# Patient Record
Sex: FEMALE | Race: BLACK OR AFRICAN AMERICAN | Employment: FULL TIME | URBAN - METROPOLITAN AREA
[De-identification: names, ages, dates, MRNs, and addresses within clinical notes are randomized per-mention and may not be internally consistent; named-entity substitution may affect disease eponyms.]

---

## 2018-03-06 ENCOUNTER — APPOINTMENT (OUTPATIENT)
Dept: ULTRASOUND IMAGING | Facility: CLINIC | Age: 23
End: 2018-03-06
Attending: EMERGENCY MEDICINE

## 2018-03-06 ENCOUNTER — HOSPITAL ENCOUNTER (EMERGENCY)
Facility: CLINIC | Age: 23
Discharge: HOME | End: 2018-03-06
Attending: EMERGENCY MEDICINE | Admitting: EMERGENCY MEDICINE

## 2018-03-06 VITALS
HEART RATE: 77 BPM | OXYGEN SATURATION: 100 % | WEIGHT: 150 LBS | BODY MASS INDEX: 21.47 KG/M2 | RESPIRATION RATE: 14 BRPM | DIASTOLIC BLOOD PRESSURE: 64 MMHG | HEIGHT: 70 IN | SYSTOLIC BLOOD PRESSURE: 119 MMHG | TEMPERATURE: 98.7 F

## 2018-03-06 DIAGNOSIS — N94.6 DYSMENORRHEA: ICD-10-CM

## 2018-03-06 DIAGNOSIS — N93.9 VAGINAL BLEEDING: ICD-10-CM

## 2018-03-06 LAB
B-HCG SERPL-ACNC: <1 IU/L (ref 0–5)
BASOPHILS # BLD AUTO: 0 10E9/L (ref 0–0.2)
BASOPHILS NFR BLD AUTO: 0.3 %
DIFFERENTIAL METHOD BLD: NORMAL
EOSINOPHIL # BLD AUTO: 0 10E9/L (ref 0–0.7)
EOSINOPHIL NFR BLD AUTO: 0.3 %
ERYTHROCYTE [DISTWIDTH] IN BLOOD BY AUTOMATED COUNT: 14.1 % (ref 10–15)
HCT VFR BLD AUTO: 37.4 % (ref 35–47)
HGB BLD-MCNC: 12.1 G/DL (ref 11.7–15.7)
IMM GRANULOCYTES # BLD: 0 10E9/L (ref 0–0.4)
IMM GRANULOCYTES NFR BLD: 0.2 %
LYMPHOCYTES # BLD AUTO: 2.6 10E9/L (ref 0.8–5.3)
LYMPHOCYTES NFR BLD AUTO: 25.8 %
MCH RBC QN AUTO: 29.6 PG (ref 26.5–33)
MCHC RBC AUTO-ENTMCNC: 32.4 G/DL (ref 31.5–36.5)
MCV RBC AUTO: 91 FL (ref 78–100)
MONOCYTES # BLD AUTO: 1 10E9/L (ref 0–1.3)
MONOCYTES NFR BLD AUTO: 10.2 %
NEUTROPHILS # BLD AUTO: 6.4 10E9/L (ref 1.6–8.3)
NEUTROPHILS NFR BLD AUTO: 63.2 %
NRBC # BLD AUTO: 0 10*3/UL
NRBC BLD AUTO-RTO: 0 /100
PLATELET # BLD AUTO: 285 10E9/L (ref 150–450)
RBC # BLD AUTO: 4.09 10E12/L (ref 3.8–5.2)
WBC # BLD AUTO: 10.1 10E9/L (ref 4–11)

## 2018-03-06 PROCEDURE — 99284 EMERGENCY DEPT VISIT MOD MDM: CPT | Mod: 25

## 2018-03-06 PROCEDURE — 86900 BLOOD TYPING SEROLOGIC ABO: CPT | Performed by: EMERGENCY MEDICINE

## 2018-03-06 PROCEDURE — 76801 OB US < 14 WKS SINGLE FETUS: CPT

## 2018-03-06 PROCEDURE — 86901 BLOOD TYPING SEROLOGIC RH(D): CPT | Performed by: EMERGENCY MEDICINE

## 2018-03-06 PROCEDURE — 86850 RBC ANTIBODY SCREEN: CPT | Performed by: EMERGENCY MEDICINE

## 2018-03-06 PROCEDURE — 84702 CHORIONIC GONADOTROPIN TEST: CPT | Performed by: EMERGENCY MEDICINE

## 2018-03-06 PROCEDURE — 85025 COMPLETE CBC W/AUTO DIFF WBC: CPT | Performed by: EMERGENCY MEDICINE

## 2018-03-06 ASSESSMENT — ENCOUNTER SYMPTOMS: ABDOMINAL PAIN: 1

## 2018-03-06 NOTE — ED AVS SNAPSHOT
Emergency Department    [address] [place] [address]    Phone:  [phone]    Fax:  [phone]                                       Kris Ferro   MRN: [id number]    Department:   Emergency Department   Date of Visit:  3/6/2018           After Visit Summary Signature Page     I have received my discharge instructions, and my questions have been answered. I have discussed any challenges I see with this plan with the nurse or doctor.    ..........................................................................................................................................  Patient/Patient Representative Signature      ..........................................................................................................................................  Patient Representative Print Name and Relationship to Patient    ..................................................               ................................................  Date                                            Time    ..........................................................................................................................................  Reviewed by Signature/Title    ...................................................              ..............................................  Date                                                            Time

## 2018-03-06 NOTE — ED AVS SNAPSHOT
Emergency Department    [address] [facility] [address]    Phone:  [phone]    Fax:  [phone]                                       Kris Ferro   MRN: [id number]    Department:   Emergency Department   Date of Visit:  3/6/2018           Patient Information     Date Of Birth          1995        Your diagnoses for this visit were:     Vaginal bleeding     Dysmenorrhea        You were seen by Aditi Diggs MD.      Follow-up Information     Schedule an appointment as soon as possible for a visit with Clinic, [facility].    Contact information:    [facility]  [address]  [facility] [address] [phone]          Follow up with  Emergency Department.    Specialty:  EMERGENCY MEDICINE    Why:  If symptoms worsen    Contact information:    [address] [facility] [id number] [phone]        Discharge Instructions         Painful Menstrual Periods (Dysmenorrhea)    Dysmenorrhea is the term used to describe painful menstrual periods.  The uterus is a muscle. Normally, chemicals called prostaglandins cause the uterus to contract during your period. The contractions push out the build-up of tissue that occurs each month inside the uterus. If the contraction is very strong, it can cause pain. The pain may feel like cramping in the lower abdomen, lower back, or thighs. In severe cases, you may have other symptoms as well. These can include nausea, vomiting, loose stools, sweating, or dizziness.  There are 2 types of dysmenorrhea:  Primary dysmenorrhea refers to common menstrual cramps. It may begin 1 or 2 years after you first get your period. It may get better or go away as you get older or when you have a baby. The cramps are most often felt just before, or on the day of your period. They may last 1 to 3 days. Treatment is with medicines and comfort measures as described below (see the  Home care   section).  Secondary dysmenorrhea may start later in life. It describes menstrual pain that occurs due to underlying health problem. The pain may last longer than common menstrual cramps. It may also worsen over time. Some problems that can lead to secondary dysmenorrhea include:     Pelvic inflammatory disease (PID): Infection that involves the female reproductive organs, such as the uterus and fallopian tubes    Fibroids: Benign growths within the wall of the uterus (not cancer)    Endometriosis: Tissue that normally only lines the uterus also grows outside of it (because the abnormal tissue also swells and bleeds each month, it can cause pain)  Once the cause of secondary dysmenorrhea is found, it can be treated. Your healthcare provider will discuss options with you as needed. Your care may also include some of the treatments described below (see the  Home care  section).  Home care  Medicines  Certain medicines can be used to help relieve or prevent menstrual pain and cramping. These can include:    Nonsteroidal anti-inflammatory drugs (NSAIDs), such as ibuprofen    Prescription pain medicine, if needed    Hormone therapy (this includes most methods of hormonal birth control such as pills, shots, or a hormone-releasing IUD)  General care  To help relieve pain and cramping, try these tips:    Rest as needed.    Apply a heating pad to the lower belly or back as directed. A warm bath or massage to these areas may also help.    Exercise regularly. Many women find that being more active each week helps reduce pain and cramping.    Ask your healthcare provider for advice about other treatments you can try to help control pain and cramping.  Follow-up care  Follow up with your healthcare provider as advised.  When to seek medical advice  Call your healthcare provider right away if any of these occur:    Fever of 100.4 F (38 C) or higher, or as directed by your provider    Pain or cramping worsens or doesn t improve  with medicine    Pain or cramping lasts longer than usual or occurs between periods    Unusual vaginal discharge between periods    Bleeding becomes heavy (soaking more than 1 pad or tampon every hour for 3 hours)    Passage of pink or gray tissue from the vagina  Date Last Reviewed: 6/11/2015 2000-2017 [employer] [address] [place] [address]. All rights reserved. This information is not intended as a substitute for professional medical care. Always follow your healthcare professional's instructions.          24 Hour Appointment Hotline       To make an appointment at any [facility], call [phone] ([phone]). If you don't have a family doctor or clinic, we will help you find one. [place] clinics are conveniently located to serve the needs of you and your family.             Review of your medicines      Our records show that you are taking the medicines listed below. If these are incorrect, please call your family doctor or clinic.        Dose / Directions Last dose taken    TYLENOL PO        Refills:  0                Procedures and tests performed during your visit     CBC with platelets differential    HCG quantitative pregnancy    US OB < 14 Weeks Single      Orders Needing Specimen Collection     None      Pending Results     No orders found from 3/4/2018 to 3/7/2018.            Pending Culture Results     No orders found from 3/4/2018 to 3/7/2018.            Pending Results Instructions     If you had any lab results that were not finalized at the time of your Discharge, you can call the ED Lab Result RN at [phone]. You will be contacted by this team for any positive Lab results or changes in treatment. The nurses are available 7 days a week from 10A to 6:30P.  You can leave a message 24 hours per day and they will return your call.        Test Results From Your Hospital Stay        3/6/2018  9:44 PM      Component Results     Component Value Ref Range &  Units Status    WBC 10.1 4.0 - 11.0 10e9/L Final    RBC Count 4.09 3.8 - 5.2 10e12/L Final    Hemoglobin 12.1 11.7 - 15.7 g/dL Final    Hematocrit 37.4 35.0 - 47.0 % Final    MCV 91 78 - 100 fl Final    MCH 29.6 26.5 - 33.0 pg Final    MCHC 32.4 31.5 - 36.5 g/dL Final    RDW 14.1 10.0 - 15.0 % Final    Platelet Count 285 150 - 450 10e9/L Final    Diff Method Automated Method  Final    % Neutrophils 63.2 % Final    % Lymphocytes 25.8 % Final    % Monocytes 10.2 % Final    % Eosinophils 0.3 % Final    % Basophils 0.3 % Final    % Immature Granulocytes 0.2 % Final    Nucleated RBCs 0 0 /100 Final    Absolute Neutrophil 6.4 1.6 - 8.3 10e9/L Final    Absolute Lymphocytes 2.6 0.8 - 5.3 10e9/L Final    Absolute Monocytes 1.0 0.0 - 1.3 10e9/L Final    Absolute Eosinophils 0.0 0.0 - 0.7 10e9/L Final    Absolute Basophils 0.0 0.0 - 0.2 10e9/L Final    Abs Immature Granulocytes 0.0 0 - 0.4 10e9/L Final    Absolute Nucleated RBC 0.0  Final               3/6/2018 10:14 PM      Component Results     Component Value Ref Range & Units Status    HCG Quantitative Serum <1 0 - 5 IU/L Final               3/6/2018 10:40 PM      Narrative     US OB < 14 WEEKS SINGLE   3/6/2018 10:01 PM     HISTORY: Early pregnancy with right-sided cramping pain and bleeding.    COMPARISON: None.    FINDINGS: The patient declined endovaginal scanning. No visible  intrauterine gestational sac identified. The right ovary appears  within normal limits. The left ovary is limited in visualization, but  is grossly unremarkable. No adnexal lesion. No free fluid in the  pelvis. Endometrial stripe is 0.4 cm.        Impression     IMPRESSION: No intrauterine products of conception identified. No  adnexal lesion or free fluid within the pelvis. In the setting of  positive pregnancy, the differential includes early pregnancy,  nonviable pregnancy, and ectopic pregnancy. Recommend serial beta hCG  assessment and imaging as needed.    VERONICA TRAN MD                 Clinical Quality Measure: Blood Pressure Screening     Your blood pressure was checked while you were in the emergency department today. The last reading we obtained was  BP: 119/64 . Please read the guidelines below about what these numbers mean and what you should do about them.  If your systolic blood pressure (the top number) is less than 120 and your diastolic blood pressure (the bottom number) is less than 80, then your blood pressure is normal. There is nothing more that you need to do about it.  If your systolic blood pressure (the top number) is 120-139 or your diastolic blood pressure (the bottom number) is 80-89, your blood pressure may be higher than it should be. You should have your blood pressure rechecked within a year by a primary care provider.  If your systolic blood pressure (the top number) is 140 or greater or your diastolic blood pressure (the bottom number) is 90 or greater, you may have high blood pressure. High blood pressure is treatable, but if left untreated over time it can put you at risk for heart attack, stroke, or kidney failure. You should have your blood pressure rechecked by a primary care provider within the next 4 weeks.  If your provider in the emergency department today gave you specific instructions to follow-up with your doctor or provider even sooner than that, you should follow that instruction and not wait for up to 4 weeks for your follow-up visit.        Thank you for choosing [facility]       Thank you for choosing [facility] for your care. Our goal is always to provide you with excellent care. Hearing back from our patients is one way we can continue to improve our services. Please take a few minutes to complete the written survey that you may receive in the mail after you visit with us. Thank you!        [employer] Information     [employer] lets you send messages to your doctor, view your test results, renew your prescriptions, schedule appointments and more. To sign up,  "go to [email or web address] . Click on \"Log in\" on the left side of the screen, which will take you to the Welcome page. Then click on \"Sign up Now\" on the right side of the page.     You will be asked to enter the access code listed below, as well as some personal information. Please follow the directions to create your username and password.     Your access code is: [email or web address]  Expires: 2018 10:54 PM     Your access code will  in 90 days. If you need help or a new code, please call your [facility] clinic or [phone].        Care EveryWhere ID     This is your Care EveryWhere ID. This could be used by other organizations to access your [facility] medical records  [id number]        Equal Access to Services     RED LAW : Thom Mcdonough, shavon harris, yolanda dey, sheila weinberg. So [facility] [phone].    ATENCIÓN: Si habla español, tiene a navarro disposición servicios gratuitos de asistencia lingüística. Devin al [phone].    We comply with applicable federal civil rights laws and [place] laws. We do not discriminate on the basis of race, color, national origin, age, disability, sex, sexual orientation, or gender identity.            After Visit Summary       This is your record. Keep this with you and show to your community pharmacist(s) and doctor(s) at your next visit.                  "

## 2018-03-07 NOTE — ED PROVIDER NOTES
"  History     Chief Complaint:  Abdominal Pain (2 weeks pg. Pt states \"A lot of bleeding\")    The history is provided by the patient.      Kris Ferro is a 22 year old  female who presents to the emergency department today for evaluation of abdominal pain. The patient presents with abdominal pain on her left and right side. She reportedly took a home pregnancy test 3 days ago that showed positive. Her last period was early in January, and does not have an OB/GYN. Since yesterday she has had vaginal bleeding similar to her normal period. She denies any clotting disorder, recent trauma, and recent sex.  She denies dizziness or lightheadedness.  Patient endorses bloating.  Normal bowel movements.  No back pain.  No fever or vomiting.  No vaginal discharge that is new or different.     Allergies:  No Known Drug Allergies    Medications:    Acetaminophen    Past Medical History:    History reviewed. No pertinent past medical history.    Past Surgical History:    History reviewed. No pertinent surgical history.    Family History:    History reviewed. No pertinent family history.    Social History:  The patient was accompanied to the ED by herself.  Smoking Status: Never Smoker   Marital Status:  Single     Review of Systems   Gastrointestinal: Positive for abdominal pain.   Genitourinary: Positive for vaginal bleeding.   All other systems reviewed and are negative.    Physical Exam     Patient Vitals for the past 24 hrs:   BP Temp Temp src Pulse Resp SpO2 Height Weight   18 2057 119/64 98.7  F (37.1  C) Oral 77 14 100 % 1.778 m (5' 10\") 68 kg (150 lb)      Physical Exam  General: Patient is alert and normal appearing.  HEENT: Head atraumatic    Eyes: pupils equal and reactive. Conjunctiva clear   Nares: patent   Oropharynx: no lesions, uvula midline, no palatal draping, normal voice, no trismus  Neck: Supple without lymphadenopathy, no meningismus  Chest: Heart regular rate and rhythm.   Lungs: Equal clear to " auscultation with no wheeze or rales  Abdomen: Soft, generalized lower abdominal tenderness, no rebound or guarding, nondistended, normal bowel sounds  Back: No costovertebral angle tenderness, no midline C, T or L spine tenderness  Neuro: Grossly nonfocal, normal speech, strength equal bilaterally, CN 2-12 intact  Extremities: No deformities, equal radial and DP pulses. No clubbing, cyanosis.  No edema  Skin: Warm and dry with no rash.       Emergency Department Course         Imaging:  Radiology findings were communicated with the patient who voiced understanding of the findings.    US OB < 14 Weeks Single  No intrauterine products of conception identified. No  adnexal lesion or free fluid within the pelvis. In the setting of  positive pregnancy, the differential includes early pregnancy,  nonviable pregnancy, and ectopic pregnancy. Recommend serial beta hCG  assessment and imaging as needed.  Reading per radiology    Laboratory:  Laboratory findings were communicated with the patient who voiced understanding of the findings.    HCG Quantitative Pregnancy: <1  CBC: WBC 10.1, HGB 12.1,     Emergency Department Course:    2057 Nursing notes and vitals reviewed.    2115 I performed an exam of the patient as documented above.     2130 IV was inserted and blood was drawn for laboratory testing, results above.     2201 The patient was sent for a US while in the emergency department, results above.      2300 I personally reviewed the lab and imaging results with the patient and answered all related questions prior to discharge.    Impression & Plan      Medical Decision Making:  Kris Ferro is a 22 year old female who presents to the emergency department today for evaluation of vaginal breech bleeding and pregnancy.  Patient states she took a home pregnancy test 2 days ago that was positive.  She is unsure of her last menstrual period but feels that it was in January.  Here in the emergency department patient  had a quantitative hCG that was less than 1.  Prior to obtaining that result she did go to ultrasound, this ultrasound does not show any intrauterine products of conception, in addition there is no adnexal lesions or free fluid in the pelvis.  Do not feel the patient's symptoms consistent with ectopic pregnancy given an hCG of less than 1.  This likely represents the patient's menstrual period and the cramping that she has felt consistent with dysmenorrhea.  I do not feel the patient has tubo-ovarian abscess, ovarian torsion as the cause of her symptoms today.  Patient's hemoglobin levels at an acceptable level and she is hemodynamically stable.  Discussed with patient following up with OB/GYN and return precautions the emergency department.  Patient expressed agreement and understanding the plan and all questions and concerns addressed.    Diagnosis:    ICD-10-CM    1. Vaginal bleeding N93.9    2. Dysmenorrhea N94.6      Disposition:   Discharge    Scribe Disclosure:  Bladimir PACKER, am serving as a scribe at 9:15 PM on 3/6/2018 to document services personally performed by Aditi Diggs MD based on my observations and the provider's statements to me.      EMERGENCY DEPARTMENT       Aditi Diggs MD  03/06/18 0676

## 2018-03-07 NOTE — DISCHARGE INSTRUCTIONS
Painful Menstrual Periods (Dysmenorrhea)    Dysmenorrhea is the term used to describe painful menstrual periods.  The uterus is a muscle. Normally, chemicals called prostaglandins cause the uterus to contract during your period. The contractions push out the build-up of tissue that occurs each month inside the uterus. If the contraction is very strong, it can cause pain. The pain may feel like cramping in the lower abdomen, lower back, or thighs. In severe cases, you may have other symptoms as well. These can include nausea, vomiting, loose stools, sweating, or dizziness.  There are 2 types of dysmenorrhea:  Primary dysmenorrhea refers to common menstrual cramps. It may begin 1 or 2 years after you first get your period. It may get better or go away as you get older or when you have a baby. The cramps are most often felt just before, or on the day of your period. They may last 1 to 3 days. Treatment is with medicines and comfort measures as described below (see the  Home care  section).  Secondary dysmenorrhea may start later in life. It describes menstrual pain that occurs due to underlying health problem. The pain may last longer than common menstrual cramps. It may also worsen over time. Some problems that can lead to secondary dysmenorrhea include:     Pelvic inflammatory disease (PID): Infection that involves the female reproductive organs, such as the uterus and fallopian tubes    Fibroids: Benign growths within the wall of the uterus (not cancer)    Endometriosis: Tissue that normally only lines the uterus also grows outside of it (because the abnormal tissue also swells and bleeds each month, it can cause pain)  Once the cause of secondary dysmenorrhea is found, it can be treated. Your healthcare provider will discuss options with you as needed. Your care may also include some of the treatments described below (see the  Home care  section).  Home care  Medicines  Certain medicines can be used to help  relieve or prevent menstrual pain and cramping. These can include:    Nonsteroidal anti-inflammatory drugs (NSAIDs), such as ibuprofen    Prescription pain medicine, if needed    Hormone therapy (this includes most methods of hormonal birth control such as pills, shots, or a hormone-releasing IUD)  General care  To help relieve pain and cramping, try these tips:    Rest as needed.    Apply a heating pad to the lower belly or back as directed. A warm bath or massage to these areas may also help.    Exercise regularly. Many women find that being more active each week helps reduce pain and cramping.    Ask your healthcare provider for advice about other treatments you can try to help control pain and cramping.  Follow-up care  Follow up with your healthcare provider as advised.  When to seek medical advice  Call your healthcare provider right away if any of these occur:    Fever of 100.4 F (38 C) or higher, or as directed by your provider    Pain or cramping worsens or doesn t improve with medicine    Pain or cramping lasts longer than usual or occurs between periods    Unusual vaginal discharge between periods    Bleeding becomes heavy (soaking more than 1 pad or tampon every hour for 3 hours)    Passage of pink or gray tissue from the vagina  Date Last Reviewed: 6/11/2015 2000-2017 [employer]. [address] [place] [address], [place] [address]. All rights reserved. This information is not intended as a substitute for professional medical care. Always follow your healthcare professional's instructions.

## 2018-06-08 ENCOUNTER — HOSPITAL ENCOUNTER (EMERGENCY)
Facility: CLINIC | Age: 23
Discharge: HOME | End: 2018-06-08
Attending: EMERGENCY MEDICINE | Admitting: EMERGENCY MEDICINE

## 2018-06-08 VITALS
SYSTOLIC BLOOD PRESSURE: 125 MMHG | TEMPERATURE: 97.4 F | DIASTOLIC BLOOD PRESSURE: 76 MMHG | WEIGHT: 145 LBS | BODY MASS INDEX: 20.76 KG/M2 | HEIGHT: 70 IN | RESPIRATION RATE: 18 BRPM | OXYGEN SATURATION: 100 % | HEART RATE: 79 BPM

## 2018-06-08 DIAGNOSIS — L72.3 SEBACEOUS CYST: ICD-10-CM

## 2018-06-08 PROCEDURE — 99282 EMERGENCY DEPT VISIT SF MDM: CPT

## 2018-06-08 ASSESSMENT — ENCOUNTER SYMPTOMS
NAUSEA: 0
VOMITING: 0
DIARRHEA: 0
FEVER: 0

## 2018-06-08 NOTE — DISCHARGE INSTRUCTIONS
Epidermoid Cyst (Sebaceous Cyst), No Infection  An epidermoid cyst (sebaceous cyst) is a term that refers to 2 similar types of cysts: those found in the skin (epidermoid), and those found around hair follicles (pilar).  Some general facts about these cysts:    A cyst is a sac filled with material that is often cheesy, fatty, oily, or fibrous. The material in them can be thick (like cottage cheese) or liquid.    They form slowly under the skin, and can be found on most parts of the body. They are most often found in hairier areas like the scalp, face, upper back, and genitals.    You can usually move them slightly if you try.    They can be smaller than a pea or as large as a few inches.    They are usually not painful, unless they become inflamed or infected.    The area around the cyst may smell bad. If the cyst breaks open, the material inside it often smells bad too.  Causes  Epidermoid cysts are caused when skin (epidermal) cells move under the skin surface, or are covered over by it. These cells continue to multiply, like skin does normally. They then form a wall around themselves (cyst) and secrete normal skin fluids (keratin). This may be developmental. But it often happens because of an injury to the skin.  Epidermoid cysts are often found around hair follicles. These follicles are like cysts, but they have openings. Normal lubricating oils for your hair are sent out through these openings. A cyst occurs when an opening becomes blocked or the site inflamed. This often occurs when there is damage to the hair follicles by a scrape or wound.    Pilar cysts are similar to epidermoid cysts. But they start from a different part of the hair follicle, and are more likely to be on the scalp.  Symptoms    Feeling a lump just beneath the skin    It may or may not be painful    The cyst may or may not smell bad    The cyst may become inflamed or red    The cyst may leak fluid or thick material  Home care  Epidermoid  cysts often go away without any treatment. If your cyst doesn t go away, and it bothers you, it may be drained or removed. If the cyst drains on its own, it may return. Resist the temptation to squeeze, pop, stick a needle in it, or cut it open. This often leads to an infection and scarring. If it gets severely inflamed or infected, you should seek medical care. Be sure to clean the cyst area when bathing or showering. Watch for the signs of infection listed below.  Follow-up care  Follow up with your healthcare provider, or as advised.  When to seek medical advice  Call your healthcare provider right away if any of these occur:    Swelling, redness, or pain    Pus coming from the cyst  Date Last Reviewed: 8/1/2016 2000-2017 [employer]. [address] [place] [address], [place] [address]. All rights reserved. This information is not intended as a substitute for professional medical care. Always follow your healthcare professional's instructions.

## 2018-06-08 NOTE — ED AVS SNAPSHOT
Emergency Department    [address] [facility] [address]    Phone:  [phone]    Fax:  [phone]                                       Kris Ferro   MRN: [id number]    Department:   Emergency Department   Date of Visit:  6/8/2018           Patient Information     Date Of Birth          1995        Your diagnoses for this visit were:     Sebaceous cyst        You were seen by Filiberto Kenny MD.      Follow-up Information     Follow up with [facility], MD Nicola. Schedule an appointment as soon as possible for a visit in 1 week.    Specialty:  Dermatology    Why:  As needed, For repeat evaluation and symptom check    Contact information:    [address] [address]          Follow up with  Emergency Department.    Specialty:  EMERGENCY MEDICINE    Why:  If symptoms worsen    Contact information:    [address] [facility] [id number] [phone]        Discharge Instructions         Epidermoid Cyst (Sebaceous Cyst), No Infection  An epidermoid cyst (sebaceous cyst) is a term that refers to 2 similar types of cysts: those found in the skin (epidermoid), and those found around hair follicles (pilar).  Some general facts about these cysts:    A cyst is a sac filled with material that is often cheesy, fatty, oily, or fibrous. The material in them can be thick (like cottage cheese) or liquid.    They form slowly under the skin, and can be found on most parts of the body. They are most often found in hairier areas like the scalp, face, upper back, and genitals.    You can usually move them slightly if you try.    They can be smaller than a pea or as large as a few inches.    They are usually not painful, unless they become inflamed or infected.    The area around the cyst may smell bad. If the cyst breaks open, the material inside it often smells bad too.  Causes  Epidermoid cysts are caused when skin (epidermal) cells  move under the skin surface, or are covered over by it. These cells continue to multiply, like skin does normally. They then form a wall around themselves (cyst) and secrete normal skin fluids (keratin). This may be developmental. But it often happens because of an injury to the skin.  Epidermoid cysts are often found around hair follicles. These follicles are like cysts, but they have openings. Normal lubricating oils for your hair are sent out through these openings. A cyst occurs when an opening becomes blocked or the site inflamed. This often occurs when there is damage to the hair follicles by a scrape or wound.    Pilar cysts are similar to epidermoid cysts. But they start from a different part of the hair follicle, and are more likely to be on the scalp.  Symptoms    Feeling a lump just beneath the skin    It may or may not be painful    The cyst may or may not smell bad    The cyst may become inflamed or red    The cyst may leak fluid or thick material  Home care  Epidermoid cysts often go away without any treatment. If your cyst doesn t go away, and it bothers you, it may be drained or removed. If the cyst drains on its own, it may return. Resist the temptation to squeeze, pop, stick a needle in it, or cut it open. This often leads to an infection and scarring. If it gets severely inflamed or infected, you should seek medical care. Be sure to clean the cyst area when bathing or showering. Watch for the signs of infection listed below.  Follow-up care  Follow up with your healthcare provider, or as advised.  When to seek medical advice  Call your healthcare provider right away if any of these occur:    Swelling, redness, or pain    Pus coming from the cyst  Date Last Reviewed: 8/1/2016 2000-2017 [employer]. [address] [place] [address], [place] [address]. All rights reserved. This information is not intended as a substitute for professional medical care. Always follow your healthcare  professional's instructions.          24 Hour Appointment Hotline       To make an appointment at any [facility], call [phone] ([phone]). If you don't have a family doctor or clinic, we will help you find one. [facility] are conveniently located to serve the needs of you and your family.             Review of your medicines      Our records show that you are taking the medicines listed below. If these are incorrect, please call your family doctor or clinic.        Dose / Directions Last dose taken    TYLENOL PO        Refills:  0                Orders Needing Specimen Collection     None      Pending Results     No orders found from 6/6/2018 to 6/9/2018.            Pending Culture Results     No orders found from 6/6/2018 to 6/9/2018.            Pending Results Instructions     If you had any lab results that were not finalized at the time of your Discharge, you can call the ED Lab Result RN at [phone]. You will be contacted by this team for any positive Lab results or changes in treatment. The nurses are available 7 days a week from 10A to 6:30P.  You can leave a message 24 hours per day and they will return your call.        Test Results From Your Hospital Stay               Clinical Quality Measure: Blood Pressure Screening     Your blood pressure was checked while you were in the emergency department today. The last reading we obtained was  BP: 125/76 . Please read the guidelines below about what these numbers mean and what you should do about them.  If your systolic blood pressure (the top number) is less than 120 and your diastolic blood pressure (the bottom number) is less than 80, then your blood pressure is normal. There is nothing more that you need to do about it.  If your systolic blood pressure (the top number) is 120-139 or your diastolic blood pressure (the bottom number) is 80-89, your blood pressure may be higher than it should be. You should have your blood pressure  "rechecked within a year by a primary care provider.  If your systolic blood pressure (the top number) is 140 or greater or your diastolic blood pressure (the bottom number) is 90 or greater, you may have high blood pressure. High blood pressure is treatable, but if left untreated over time it can put you at risk for heart attack, stroke, or kidney failure. You should have your blood pressure rechecked by a primary care provider within the next 4 weeks.  If your provider in the emergency department today gave you specific instructions to follow-up with your doctor or provider even sooner than that, you should follow that instruction and not wait for up to 4 weeks for your follow-up visit.        Thank you for choosing [facility]       Thank you for choosing [facility] for your care. Our goal is always to provide you with excellent care. Hearing back from our patients is one way we can continue to improve our services. Please take a few minutes to complete the written survey that you may receive in the mail after you visit with us. Thank you!        [employer] Information     [employer] lets you send messages to your doctor, view your test results, renew your prescriptions, schedule appointments and more. To sign up, go to [email or web address] . Click on \"Log in\" on the left side of the screen, which will take you to the Welcome page. Then click on \"Sign up Now\" on the right side of the page.     You will be asked to enter the access code listed below, as well as some personal information. Please follow the directions to create your username and password.     Your access code is: [email or web address]  Expires: 2018 11:41 AM     Your access code will  in 90 days. If you need help or a new code, please call your [facility] clinic or [phone].        Care EveryWhere ID     This is your Care EveryWhere ID. This could be used by other organizations to access your [facility] medical records  [id number]        Equal Access to " Services     [facility]: Thom Mcdonough, waandrzejda luqadaha, qaybta kasheila torres. So [facility] [phone].    ATENCIÓN: Si habla español, tiene a navarro disposición servicios gratuitos de asistencia lingüística. Llame al [phone].    We comply with applicable federal civil rights laws and [place] laws. We do not discriminate on the basis of race, color, national origin, age, disability, sex, sexual orientation, or gender identity.            After Visit Summary       This is your record. Keep this with you and show to your community pharmacist(s) and doctor(s) at your next visit.

## 2018-06-08 NOTE — ED NOTES
Pt given AVS and phone number for [facility] [place]. Pt states will call to get appt ASAP. Pt to f/u with them

## 2018-06-08 NOTE — ED AVS SNAPSHOT
Emergency Department    [address] [place] [address]    Phone:  [phone]    Fax:  [phone]                                       Kris Ferro   MRN: [id number]    Department:   Emergency Department   Date of Visit:  6/8/2018           After Visit Summary Signature Page     I have received my discharge instructions, and my questions have been answered. I have discussed any challenges I see with this plan with the nurse or doctor.    ..........................................................................................................................................  Patient/Patient Representative Signature      ..........................................................................................................................................  Patient Representative Print Name and Relationship to Patient    ..................................................               ................................................  Date                                            Time    ..........................................................................................................................................  Reviewed by Signature/Title    ...................................................              ..............................................  Date                                                            Time

## 2018-06-08 NOTE — ED PROVIDER NOTES
"  History     Chief Complaint:  Cyst     HPI   Kris Ferro is a 23 year old female who presents to the emergency department today for evaluation of bilateral axillary cyst pain.  The patient has had bilateral axillary cysts for close to 10 years. She has had some increased pain lately. She has no fever, vomiting, or diarrhea.     Allergies:  No Known Drug Allergies    Medications:    Acetaminophen     Past Medical History:    History reviewed. No pertinent past medical history.    Past Surgical History:    History reviewed. No pertinent surgical history.    Family History:    History reviewed. No pertinent family history.    Social History:  The patient was accompanied to the Emergency Department by nobody.  Smoking Status: Current Ever Day Smoker  Smokeless Tobacco: Never Used  Alcohol Use: Negative  Marital Status:  Single     Review of Systems   Constitutional: Negative for fever.   Gastrointestinal: Negative for diarrhea, nausea and vomiting.   Skin:        Axillary cyst bilateral   All other systems reviewed and are negative.    Physical Exam     Patient Vitals for the past 24 hrs:   BP Temp Temp src Pulse Resp SpO2 Height Weight   06/08/18 1057 125/76 97.4  F (36.3  C) Temporal 79 18 100 % 1.778 m (5' 10\") 65.8 kg (145 lb)      Physical Exam  Vitals: reviewed by me  General: Pt seen on [facility], pleasant, cooperative, and alert to conversation  Eyes: Tracking well, clear conjunctiva BL  Resp:  No tachypnea, no accessory muscle use.   MSK: no obvious peripheral edema or joint effusion.    Skin: No rash, normal turgor and temperature  Does have bilateral axillary sebaceous cysts, the largest of which is roughly the size of a quarter, and is located to her left axilla.  It is skin colored, no erythema, no induration, no fluctuance.  Neuro: Clear speech and no facial droop.    Emergency Department Course     Emergency Department Course:    1057 Nursing notes and vitals reviewed.    1117 I performed an " exam of the patient as documented above.     1120 I personally reviewed the results with the patient and answered all related questions prior to discharge.    Impression & Plan      Medical Decision Making:  Kris Ferro is a 23 year old female with a 10 year history of bilateral axillary cysts who presents to the emergency department today for evaluation. The patient has no other symptoms other than axillary pain. Her clinical exam is also completely benign other than the presence of these cysts, and they show no signs of infection, suggesting sebaceous cysts. I discussed with her that we cannot remove these in the Emergency Department, but if she were to visit her primary care doctor or a dermatologist, this could be done. The patient requested a referral, which she received at the time of discharge. In the mean time, I encouraged ibuprofen and acetaminophen for pain control. She was given instructions on reasons for return prior to being discharged in stable condition.     Diagnosis:    ICD-10-CM    1. Sebaceous cyst L72.3      Disposition:   Discharge    Scribe Disclosure:  IBladimir, am serving as a scribe at 11:17 AM on 6/8/2018 to document services personally performed by Filiberto Kenny based on my observations and the provider's statements to me.      EMERGENCY DEPARTMENT       Filiberto Kenny MD  06/08/18 7083

## 2018-09-08 ENCOUNTER — HOSPITAL ENCOUNTER (EMERGENCY)
Facility: CLINIC | Age: 23
Discharge: HOME | End: 2018-09-08
Attending: EMERGENCY MEDICINE | Admitting: EMERGENCY MEDICINE

## 2018-09-08 VITALS
TEMPERATURE: 97.8 F | HEIGHT: 70 IN | DIASTOLIC BLOOD PRESSURE: 83 MMHG | WEIGHT: 140 LBS | BODY MASS INDEX: 20.04 KG/M2 | OXYGEN SATURATION: 100 % | SYSTOLIC BLOOD PRESSURE: 122 MMHG | RESPIRATION RATE: 16 BRPM

## 2018-09-08 DIAGNOSIS — R14.0 ABDOMINAL BLOATING: ICD-10-CM

## 2018-09-08 LAB
ALBUMIN UR-MCNC: NEGATIVE MG/DL
APPEARANCE UR: CLEAR
BILIRUB UR QL STRIP: NEGATIVE
COLOR UR AUTO: ABNORMAL
GLUCOSE BLDC GLUCOMTR-MCNC: 77 MG/DL (ref 70–99)
GLUCOSE UR STRIP-MCNC: NEGATIVE MG/DL
HCG UR QL: NEGATIVE
HGB UR QL STRIP: NEGATIVE
KETONES UR STRIP-MCNC: NEGATIVE MG/DL
LEUKOCYTE ESTERASE UR QL STRIP: NEGATIVE
NITRATE UR QL: NEGATIVE
PH UR STRIP: 6 PH (ref 5–7)
RBC #/AREA URNS AUTO: 0 /HPF (ref 0–2)
SOURCE: ABNORMAL
SP GR UR STRIP: 1 (ref 1–1.03)
UROBILINOGEN UR STRIP-MCNC: NORMAL MG/DL (ref 0–2)
WBC #/AREA URNS AUTO: <1 /HPF (ref 0–5)

## 2018-09-08 PROCEDURE — 00000146 ZZHCL STATISTIC GLUCOSE BY METER IP

## 2018-09-08 PROCEDURE — 81025 URINE PREGNANCY TEST: CPT | Performed by: EMERGENCY MEDICINE

## 2018-09-08 PROCEDURE — 99283 EMERGENCY DEPT VISIT LOW MDM: CPT

## 2018-09-08 PROCEDURE — 81001 URINALYSIS AUTO W/SCOPE: CPT | Performed by: EMERGENCY MEDICINE

## 2018-09-08 ASSESSMENT — ENCOUNTER SYMPTOMS
COUGH: 1
FEVER: 1
VOMITING: 1
CONSTIPATION: 0
NAUSEA: 1
ABDOMINAL DISTENTION: 1
FREQUENCY: 1
DIARRHEA: 0
DYSURIA: 0

## 2018-09-08 NOTE — ED NOTES
Patient would like to go home; discussed that the provider was still tied up with an emergency if she wanted to wait another 15 minutes to review her results with him. She did not have interest in waiting so writer printed unfinished AVS and reviewed her results, answered questions she had about bloating and pregnancy testing and patient left. Will notify MD.

## 2018-09-08 NOTE — ED AVS SNAPSHOT
Emergency Department    [address] [place] [address]    Phone:  [phone]    Fax:  [phone]                                       Kris Ferro   MRN: [id number]    Department:   Emergency Department   Date of Visit:  9/8/2018           After Visit Summary Signature Page     I have received my discharge instructions, and my questions have been answered. I have discussed any challenges I see with this plan with the nurse or doctor.    ..........................................................................................................................................  Patient/Patient Representative Signature      ..........................................................................................................................................  Patient Representative Print Name and Relationship to Patient    ..................................................               ................................................  Date                                            Time    ..........................................................................................................................................  Reviewed by Signature/Title    ...................................................              ..............................................  Date                                                            Time          22EPIC Rev 08/18

## 2018-09-08 NOTE — ED PROVIDER NOTES
"  History     Chief Complaint:  Cough; Fever; Pregnancy Test    HPI   Kris Ferro is a 23 year old female (G 2 P 0 per chart review) who presents to the emergency department for evaluation of a cough and fever; she would also like a pregnancy test. She reports unproductive cough for two weeks with a subjective fever. In terms of her pregnancy concerns, the patient reports that she has urinary frequency, has nausea, and her \"stomach is super big, especially when she stands up.\" This morning she vomited; however, she reports that she thinks she had the stomach flu three days ago and was vomiting then as well. Moreover, she states that she cannot smoke anymore. Her LMP was 08/24/18.  She denies diarrhea, dysuria, and constipation. Of note, the patient was in the emergency department with similar complaints of abdominal bloating with concerns for pregnancy in March.    Allergies:  No Known Drug Allergies     Medications:    The patient is currently on no regular medications.     Past Medical History:    History reviewed. No pertinent past medical history.     Past Surgical History:    History reviewed. No pertinent past surgical history.     Family History:    History reviewed. No pertinent family history.      Social History:  The patient was alone.  Smoking Status: Current every day smoker 0.5 PPD for 2.00 years  Smokeless Tobacco: Never  Alcohol Use: No    Marital Status:  Single      Review of Systems   Constitutional: Positive for fever (subjective).   Respiratory: Positive for cough.    Gastrointestinal: Positive for abdominal distention, nausea and vomiting. Negative for constipation and diarrhea.   Genitourinary: Positive for frequency. Negative for dysuria.   All other systems reviewed and are negative.    Physical Exam   Patient Vitals for the past 24 hrs:   BP Temp Temp src Heart Rate Resp SpO2 Height Weight   09/08/18 0624 122/83 97.8  F (36.6  C) Temporal 94 16 100 % 1.778 m (5' 10\") 63.5 kg (140 lb)    "   Physical Exam  General: Appears well-developed and well-nourished.   Head: No signs of trauma.   CV: Normal rate and regular rhythm.    Resp: Effort normal and breath sounds normal. No respiratory distress.   GI: Soft. There is no tenderness.  No rebound or guarding.  Normal bowel sounds.  No CVA tenderness.  MSK: Normal range of motion. no edema. No Calf tenderness.  Neuro: The patient is alert and oriented.  Speech normal.  GCS 15  Skin: Skin is warm and dry. No rash noted.   Psych: normal mood and affect. behavior is normal.     Emergency Department Course     Laboratory:  Laboratory findings were communicated with the patient who voiced understanding of the findings.    HCG Qualitative Urine: Negative   UA: Straw colored, clear urine. Specific Gravity Urine 1.001 (L) o/w WNL     Glucose by meter (Collected 0641): 77     Emergency Department Course:  Nursing notes and vitals reviewed.  The patient provided a urine sample here in the emergency department. This was sent for laboratory testing, findings above.  Blood was drawn for laboratory testing, results above.    0637: I performed an exam of the patient as documented above.     The patient discharged from the emergency department without discussion with myself of her laboratory results, and I was not able to answer any questions and discuss follow up or further evaluation with the patient prior to her elopement. Per nurse report, she declined to wait to speak with me. No prescriptions were given.  Nurse provided results and appropriate recommendations to patient.       Impression & Plan      Medical Decision Making:  Kris Ferro is a 23 year old woman who presents due to concern for being pregnant.  She reports that she had some nausea a few days ago is felt that her abdomen was somewhat bloated.  She reports that she had a normal menstrual period approximately 2 weeks ago.  On my evaluation, her abdomen is soft and nontender with appropriate vital signs.   Pregnancy test was obtained that was negative.  Patient also reported that she feels like she has been urinating quite a bit, so a UA was obtained that did not show any signs of UTI, and also obtain the glucose level which confirmed an appropriate glucose without signs of diabetes.  Patient reports that she is actually had similar concerns with gastroenteritis type symptoms in the past as well and had negative pregnancy tests.  Patient was informed of the negative results, and ultimately recommended to take a pregnancy test in 1-2 weeks at home to confirm that she is not pregnant.  Otherwise she is instructed to continue to push plenty of fluids.  Lungs were clear on auscultation and no coughing during my evaluation of patient.  I have a low suspicion for a bacterial infection.      Diagnosis:    ICD-10-CM    1. Abdominal bloating R14.0        Disposition:  Patient discharged to home.     Scribe Disclosure:  IMallika, am serving as a scribe at 6:32 AM on 9/8/2018 to document services personally performed by Tunde Quiros MD based on my observations and the provider's statements to me.   9/8/2018    EMERGENCY DEPARTMENT       Tunde Quiros MD  09/08/18 9211

## 2018-09-08 NOTE — ED AVS SNAPSHOT
Emergency Department    [address] [facility] [address]    Phone:  [phone]    Fax:  [phone]                                       Kris Ferro   MRN: [id number]    Department:   Emergency Department   Date of Visit:  9/8/2018           Patient Information     Date Of Birth          1995        Your diagnoses for this visit were:     None       You were seen by Tunde Quiros MD.      24 Hour Appointment Hotline       To make an appointment at any [facility], call [phone] ([phone]). If you don't have a family doctor or clinic, we will help you find one. [facility] are conveniently located to serve the needs of you and your family.             Review of your medicines      Our records show that you are taking the medicines listed below. If these are incorrect, please call your family doctor or clinic.        Dose / Directions Last dose taken    TYLENOL PO        Refills:  0                Procedures and tests performed during your visit     Glucose by meter    Glucose monitor nursing POCT    HCG qualitative urine (UPT)    UA with Microscopic      Orders Needing Specimen Collection     None      Pending Results     No orders found from 9/6/2018 to 9/9/2018.            Pending Culture Results     No orders found from 9/6/2018 to 9/9/2018.            Pending Results Instructions     If you had any lab results that were not finalized at the time of your Discharge, you can call the ED Lab Result RN at [phone]. You will be contacted by this team for any positive Lab results or changes in treatment. The nurses are available 7 days a week from 10A to 6:30P.  You can leave a message 24 hours per day and they will return your call.        Test Results From Your Hospital Stay        9/8/2018  6:43 AM      Component Results     Component Value Ref Range & Units Status    HCG Qual Urine Negative NEG^Negative Final    This test is for screening purposes.   Results should be interpreted along with   the clinical picture.  Confirmation testing is available if warranted by   ordering [id number], HCG Quantitative Pregnancy.           9/8/2018  6:53 AM      Component Results     Component Value Ref Range & Units Status    Color Urine Straw  Final    Appearance Urine Clear  Final    Glucose Urine Negative NEG^Negative mg/dL Final    Bilirubin Urine Negative NEG^Negative Final    Ketones Urine Negative NEG^Negative mg/dL Final    Specific Gravity Urine 1.001 (L) 1.003 - 1.035 Final    Blood Urine Negative NEG^Negative Final    pH Urine 6.0 5.0 - 7.0 pH Final    Protein Albumin Urine Negative NEG^Negative mg/dL Final    Urobilinogen mg/dL Normal 0.0 - 2.0 mg/dL Final    Nitrite Urine Negative NEG^Negative Final    Leukocyte Esterase Urine Negative NEG^Negative Final    Source Midstream Urine  Final    WBC Urine <1 0 - 5 /HPF Final    RBC Urine 0 0 - 2 /HPF Final         9/8/2018  6:52 AM      Component Results     Component Value Ref Range & Units Status    Glucose 77 70 - 99 mg/dL Final                Clinical Quality Measure: Blood Pressure Screening     Your blood pressure was checked while you were in the emergency department today. The last reading we obtained was  BP: 122/83 . Please read the guidelines below about what these numbers mean and what you should do about them.  If your systolic blood pressure (the top number) is less than 120 and your diastolic blood pressure (the bottom number) is less than 80, then your blood pressure is normal. There is nothing more that you need to do about it.  If your systolic blood pressure (the top number) is 120-139 or your diastolic blood pressure (the bottom number) is 80-89, your blood pressure may be higher than it should be. You should have your blood pressure rechecked within a year by a primary care provider.  If your systolic blood pressure (the top number) is 140 or greater or your diastolic blood pressure (the bottom number)  "is 90 or greater, you may have high blood pressure. High blood pressure is treatable, but if left untreated over time it can put you at risk for heart attack, stroke, or kidney failure. You should have your blood pressure rechecked by a primary care provider within the next 4 weeks.  If your provider in the emergency department today gave you specific instructions to follow-up with your doctor or provider even sooner than that, you should follow that instruction and not wait for up to 4 weeks for your follow-up visit.        Thank you for choosing [facility]       Thank you for choosing [facility] for your care. Our goal is always to provide you with excellent care. Hearing back from our patients is one way we can continue to improve our services. Please take a few minutes to complete the written survey that you may receive in the mail after you visit with us. Thank you!        [employer] Information     [email or web address] lets you send messages to your doctor, view your test results, renew your prescriptions, schedule appointments and more. To sign up, go to [email or web address] . Click on \"Log in\" on the left side of the screen, which will take you to the Welcome page. Then click on \"Sign up Now\" on the right side of the page.     You will be asked to enter the access code listed below, as well as some personal information. Please follow the directions to create your username and password.     Your access code is: [email or web address]  Expires: 2018  7:49 AM     Your access code will  in 90 days. If you need help or a new code, please call your [facility] clinic or [phone].        Care EveryWhere ID     This is your Care EveryWhere ID. This could be used by other organizations to access your [facility] medical records  [id number]        Equal Access to Services     RED LAW : shavon Qureshi qaybta kaalmada adeegyada, waxay idiin hayaan adeeg kharash la'aan ah. So camacho " [phone].    ATENCIÓN: Si habla español, tiene a navarro disposición servicios gratuitos de asistencia lingüística. Llame al [phone].    We comply with applicable federal civil rights laws and [place] laws. We do not discriminate on the basis of race, color, national origin, age, disability, sex, sexual orientation, or gender identity.            After Visit Summary       This is your record. Keep this with you and show to your community pharmacist(s) and doctor(s) at your next visit.

## 2024-03-06 ENCOUNTER — HOSPITAL ENCOUNTER (EMERGENCY)
Facility: CLINIC | Age: 29
Discharge: HOME | End: 2024-03-06
Attending: EMERGENCY MEDICINE | Admitting: EMERGENCY MEDICINE
Payer: COMMERCIAL

## 2024-03-06 ENCOUNTER — NURSE TRIAGE (OUTPATIENT)
Dept: NURSING | Facility: CLINIC | Age: 29
End: 2024-03-06
Payer: COMMERCIAL

## 2024-03-06 VITALS
HEART RATE: 78 BPM | TEMPERATURE: 98 F | RESPIRATION RATE: 16 BRPM | OXYGEN SATURATION: 99 % | DIASTOLIC BLOOD PRESSURE: 73 MMHG | SYSTOLIC BLOOD PRESSURE: 118 MMHG

## 2024-03-06 DIAGNOSIS — H10.32 ACUTE CONJUNCTIVITIS OF LEFT EYE, UNSPECIFIED ACUTE CONJUNCTIVITIS TYPE: ICD-10-CM

## 2024-03-06 PROCEDURE — 99283 EMERGENCY DEPT VISIT LOW MDM: CPT

## 2024-03-06 PROCEDURE — 250N000009 HC RX 250: Performed by: EMERGENCY MEDICINE

## 2024-03-06 RX ORDER — CIPROFLOXACIN HYDROCHLORIDE 3.5 MG/ML
1-2 SOLUTION/ DROPS TOPICAL
Qty: 2.5 ML | Refills: 0 | Status: SHIPPED | OUTPATIENT
Start: 2024-03-06 | End: 2024-03-08

## 2024-03-06 RX ORDER — PROPARACAINE HYDROCHLORIDE 5 MG/ML
1 SOLUTION/ DROPS OPHTHALMIC ONCE
Status: COMPLETED | OUTPATIENT
Start: 2024-03-06 | End: 2024-03-06

## 2024-03-06 RX ADMIN — PROPARACAINE HYDROCHLORIDE 1 DROP: 5 SOLUTION/ DROPS OPHTHALMIC at 15:30

## 2024-03-06 RX ADMIN — FLUORESCEIN SODIUM 1 STRIP: 1 STRIP OPHTHALMIC at 15:30

## 2024-03-06 ASSESSMENT — VISUAL ACUITY: OD: 20/100;WITHOUT CORRECTIVE LENSES

## 2024-03-06 ASSESSMENT — ACTIVITIES OF DAILY LIVING (ADL)
ADLS_ACUITY_SCORE: 35
ADLS_ACUITY_SCORE: 35

## 2024-03-06 ASSESSMENT — COLUMBIA-SUICIDE SEVERITY RATING SCALE - C-SSRS
2. HAVE YOU ACTUALLY HAD ANY THOUGHTS OF KILLING YOURSELF IN THE PAST MONTH?: NO
6. HAVE YOU EVER DONE ANYTHING, STARTED TO DO ANYTHING, OR PREPARED TO DO ANYTHING TO END YOUR LIFE?: NO
1. IN THE PAST MONTH, HAVE YOU WISHED YOU WERE DEAD OR WISHED YOU COULD GO TO SLEEP AND NOT WAKE UP?: NO

## 2024-03-06 NOTE — ED PROVIDER NOTES
History     Chief Complaint:  Eye Pain       HPI   Kris Ferro is a 28 year old female here for evaluation of eye pain. Patient reports right eye pain after attempting to put a contact in this morning. Her eye feels very watery and irritated. No vision changes. She normally does not wear contacts or glasses. She has history of eye irritation when she was [age] years old.     Independent Historian:   None - Patient Only    Review of External Notes:   6/29/20: ED note reviewed       Medications:    The patient is currently on no regular medications.    Past Medical History:    Myopia   Pityriasis rosea     Physical Exam   Patient Vitals for the past 24 hrs:   BP Temp Temp src Pulse Resp SpO2   03/06/24 1441 118/73 98  F (36.7  C) Oral 78 16 99 %        Physical Exam  General: No acute distress.  Head: No obvious trauma to head.  Eyes:  Injected conjunctivae in right eye. EOMI. PERRL. No abnormal uptake of fluorescein dye, no signs of ulcer or abrasion.   Neck: Normal range of motion.   CV: Skin is well perfused, no cyanosis  Respiratory: Effort normal. No audible wheezing.  Gastrointestinal: Non-distended.  Musculoskeletal: Normal range of motion. No gross deformities.  Neuro: Alert. Moving all extremities appropriately.  Normal speech.    Skin: No rashes or lesions on exposed skin.      Emergency Department Course       Imaging:  No orders to display          Laboratory:  Labs Ordered and Resulted from Time of ED Arrival to Time of ED Departure - No data to display     Procedures   NA    Emergency Department Course & Assessments:    Interventions:  Medications   proparacaine (ALCAINE) 0.5 % ophthalmic solution 1 drop (1 drop Right Eye $Given by Other Clinician 3/6/24 0465)   fluorescein (FUL-ZAIN) ophthalmic strip 1 strip (1 strip Right Eye $Given by Other Clinician 3/6/24 5510)        Independent Interpretation (X-rays, CTs, rhythm strip):  None    Assessments/Consultations/Discussion of Management or Tests:    ED Course as of 03/06/24 2251   Wed Mar 06, 2024   1522 I obtained history and examined the patient as noted above.        Social Determinants of Health affecting care:   None    Disposition:  The patient was discharged.     Impression & Plan        Medical Decision Making:  Patient presents with right eye pain and redness.  She reports she was putting in her contact lenses earlier today when the pain started.  No vision changes.  No other eye trauma.  No ulcer or abrasion is appreciated on eye exam when fluorescein dye is applied.  She does have injected conjunctiva with increased tearing.  Given she is a contact lens wearer, Pseudomonas will need to be covered.  She is given a prescription for ciprofloxacin eyedrops.  Return precautions are given and she verbalizes understanding.  She is discharged home in stable condition.      Diagnosis:    ICD-10-CM    1. Acute conjunctivitis of left eye, unspecified acute conjunctivitis type  H10.32            Discharge Medications:  Discharge Medication List as of 3/6/2024  3:46 PM        START taking these medications    Details   ciprofloxacin (CILOXAN) 0.3 % ophthalmic solution Place 1-2 drops into the right eye every 2 hours for 2 days Place 1-2 drops into the right eye every 2 hours, then 4 times daily for 5 days, Disp-2.5 mL, R-0, Local Print                Scribe Disclosure:  I, Grace Kelley, am serving as a scribe at 3:34 PM on 3/6/2024 to document services personally performed by Vivek Ahuja MD based on my observations and the provider's statements to me.   3/6/2024   Vivek Ahuja MD Peery, Stephen, MD  03/06/24 4742

## 2024-03-06 NOTE — ED TRIAGE NOTES
Pt c/o right eye discomfort after trying to put contact in this am     Triage Assessment (Adult)       Row Name 03/06/24 1442          Triage Assessment    Airway WDL WDL        Respiratory WDL    Respiratory WDL WDL        Skin Circulation/Temperature WDL    Skin Circulation/Temperature WDL WDL        Cardiac WDL    Cardiac WDL WDL        Peripheral/Neurovascular WDL    Peripheral Neurovascular WDL WDL        Cognitive/Neuro/Behavioral WDL    Cognitive/Neuro/Behavioral WDL WDL

## 2024-03-06 NOTE — TELEPHONE ENCOUNTER
"Nurse Triage SBAR    Is this a 2nd Level Triage? NO    Situation: Right eye pain    Background: Patient calling, states that she feels like she may have left a contact in her right eye.  She states her right eye is swollen and \"feels raw\".  She states that it hurts to touch it.  She denies any recent injury to her eye. She states her vision has not changed.     Assessment: Possible foreign body     Protocol Recommended Disposition:   Go to ED Now    Recommendation: Patient will go to the ED now.      N/A    MAYTE CRAWFORD RN    Does the patient meet one of the following criteria for ADS visit consideration? No    Reason for Disposition   Foreign body sensation ('feels like something is in there') and irrigation didn't help    Additional Information   Negative: Followed an eye injury   Negative: Eye pain from chemical in the eye   Negative: Eye pain from foreign body in eye   Negative: Has sinus pain or pressure   Negative: Severe eye pain   Negative: Complete loss of vision in one or both eyes   Negative: Eyelids are very swollen (shut or almost) and fever   Negative: Eyelid (outer) is very red and fever    Protocols used: Eye Pain-A-[place]    "

## 2025-02-12 ENCOUNTER — OFFICE VISIT (OUTPATIENT)
Dept: INTERNAL MEDICINE | Facility: CLINIC | Age: 30
End: 2025-02-12
Payer: COMMERCIAL

## 2025-02-12 VITALS
WEIGHT: 164.3 LBS | OXYGEN SATURATION: 100 % | RESPIRATION RATE: 18 BRPM | BODY MASS INDEX: 23.52 KG/M2 | HEIGHT: 70 IN | HEART RATE: 74 BPM | SYSTOLIC BLOOD PRESSURE: 116 MMHG | DIASTOLIC BLOOD PRESSURE: 60 MMHG | TEMPERATURE: 97.4 F

## 2025-02-12 DIAGNOSIS — Z11.1 SCREENING EXAMINATION FOR PULMONARY TUBERCULOSIS: Primary | ICD-10-CM

## 2025-02-12 PROCEDURE — 36415 COLL VENOUS BLD VENIPUNCTURE: CPT | Performed by: INTERNAL MEDICINE

## 2025-02-12 PROCEDURE — 99202 OFFICE O/P NEW SF 15 MIN: CPT | Performed by: INTERNAL MEDICINE

## 2025-02-12 NOTE — PROGRESS NOTES
"  ASSESSMENT/PLAN                                                      (Z11.1) Screening examination for pulmonary tuberculosis  (primary encounter diagnosis)  Plan: Quantiferon-TB Gold Plus today.     Jacque Pantoja MD   [address] [address]  T: [phone], F: [phone]    SUBJECTIVE                                                      Kris Ferro is a very pleasant 29 year old female who presents for TB screening:    Needs TB screening for work.    Asymptomatic from a TB perspective (no hemoptysis, night sweats, or weight loss).  No known TB exposures.  No history of positive PPD or TB Gold.     OBJECTIVE                                                      /60   Pulse 74   Temp 97.4  F (36.3  C) (Temporal)   Resp 18   Ht 1.778 m (5' 10\")   Wt 74.5 kg (164 lb 4.8 oz)   LMP 01/21/2025 (Approximate)   SpO2 100%   BMI 23.57 kg/m    Constitutional: well-appearing  Psych: normal judgment and insight; normal mood and affect; recent and remote memory intact    ---    (Note documentation was completed, in part, with [employer] voice-recognition software. Documentation was reviewed, but some grammatical, spelling, and word errors may remain.)    "

## 2025-02-13 LAB
QUANTIFERON MITOGEN: 10 IU/ML
QUANTIFERON NIL TUBE: 0.08 IU/ML
QUANTIFERON TB1 TUBE: 0.11 IU/ML
QUANTIFERON TB2 TUBE: 0.13

## 2025-03-09 ENCOUNTER — HEALTH MAINTENANCE LETTER (OUTPATIENT)
Age: 30
End: 2025-03-09

## 2025-06-03 ENCOUNTER — LAB (OUTPATIENT)
Dept: LAB | Facility: CLINIC | Age: 30
End: 2025-06-03
Payer: COMMERCIAL

## 2025-06-03 DIAGNOSIS — Z11.1 SCREENING EXAMINATION FOR PULMONARY TUBERCULOSIS: ICD-10-CM

## 2025-06-03 DIAGNOSIS — Z11.1 SCREENING EXAMINATION FOR PULMONARY TUBERCULOSIS: Primary | ICD-10-CM

## 2025-06-03 PROCEDURE — 36415 COLL VENOUS BLD VENIPUNCTURE: CPT

## 2025-06-03 PROCEDURE — 86481 TB AG RESPONSE T-CELL SUSP: CPT

## 2025-06-05 ENCOUNTER — RESULTS FOLLOW-UP (OUTPATIENT)
Dept: INTERNAL MEDICINE | Facility: CLINIC | Age: 30
End: 2025-06-05

## 2025-06-05 LAB
GAMMA INTERFERON BACKGROUND BLD IA-ACNC: 0.12 IU/ML
M TB IFN-G BLD-IMP: NEGATIVE
M TB IFN-G CD4+ BCKGRND COR BLD-ACNC: 9.88 IU/ML
MITOGEN IGNF BCKGRD COR BLD-ACNC: -0.02 IU/ML
MITOGEN IGNF BCKGRD COR BLD-ACNC: 0.01 IU/ML
QUANTIFERON MITOGEN: 10 IU/ML
QUANTIFERON NIL TUBE: 0.12 IU/ML
QUANTIFERON TB1 TUBE: 0.13 IU/ML
QUANTIFERON TB2 TUBE: 0.1